# Patient Record
Sex: FEMALE | Employment: UNEMPLOYED | URBAN - METROPOLITAN AREA
[De-identification: names, ages, dates, MRNs, and addresses within clinical notes are randomized per-mention and may not be internally consistent; named-entity substitution may affect disease eponyms.]

---

## 2017-11-13 ENCOUNTER — OFFICE VISIT (OUTPATIENT)
Dept: NEUROLOGY | Age: 42
End: 2017-11-13

## 2017-11-13 VITALS — SYSTOLIC BLOOD PRESSURE: 122 MMHG | HEART RATE: 74 BPM | WEIGHT: 230 LBS | DIASTOLIC BLOOD PRESSURE: 80 MMHG

## 2017-11-13 DIAGNOSIS — F20.81 SCHIZOPHRENIFORM DISORDER (HCC): ICD-10-CM

## 2017-11-13 DIAGNOSIS — R25.1 TREMOR OF FACE AND HANDS: Primary | ICD-10-CM

## 2017-11-13 DIAGNOSIS — F79 MENTAL RETARDATION: ICD-10-CM

## 2017-11-13 RX ORDER — BENZTROPINE MESYLATE 1 MG/1
TABLET ORAL 2 TIMES DAILY
COMMUNITY

## 2017-11-13 RX ORDER — IRBESARTAN 150 MG/1
300 TABLET ORAL
COMMUNITY

## 2017-11-13 RX ORDER — ARIPIPRAZOLE 20 MG/1
20 TABLET ORAL
COMMUNITY

## 2017-11-13 RX ORDER — ONDANSETRON 4 MG/1
4 TABLET, FILM COATED ORAL
COMMUNITY

## 2017-11-13 RX ORDER — CARBAMAZEPINE 200 MG/1
200 TABLET ORAL 2 TIMES DAILY
COMMUNITY

## 2017-11-13 RX ORDER — DIPHENHYDRAMINE HCL 25 MG
25 CAPSULE ORAL
COMMUNITY

## 2017-11-13 RX ORDER — NYSTATIN 100000 [USP'U]/G
POWDER TOPICAL 4 TIMES DAILY
COMMUNITY

## 2017-11-13 RX ORDER — ERGOCALCIFEROL 1.25 MG/1
50000 CAPSULE ORAL
COMMUNITY

## 2017-11-13 NOTE — PROGRESS NOTES
Name: Martinez Velasquez    Chief Complaint: tremor    Returns for a follow up visit. She has had relatively few tremors but this is dependant on abilify. She was weaned off the drug however her behavior was negatively affected by this. She has been mostly unchanged with regards to her demeanor and health. Assesment and Plan  1. Tremor of face and hands  Minimal compared to initial presentation. Discussed the relation of her medications to tremor and reinforced that it is a risk vs benefit and that if the tremor can't be avoided it may be best to be tolerated in interest of her overall quality of life. 2. Mental retardation  stable    3. Schizophreniform disorder ([facility] [address]  Stable on abilify and benzotropine         Allergies  Review of patient's allergies indicates not on file. Medications  Current Outpatient Prescriptions   Medication Sig    ARIPiprazole (ABILIFY) 20 mg tablet Take 20 mg by mouth daily.  benztropine (COGENTIN) 1 mg tablet Take  by mouth two (2) times a day.  carBAMazepine (TEGRETOL) 200 mg tablet Take 200 mg by mouth three (3) times daily.  irbesartan (AVAPRO) 150 mg tablet Take 150 mg by mouth nightly.  lurasidone (LATUDA) 80 mg tab tablet Take  by mouth.  nystatin (MYCOSTATIN) powder Apply  to affected area four (4) times daily.  ergocalciferol (VITAMIN D2) 50,000 unit capsule Take 50,000 Units by mouth.  diphenhydrAMINE (BENADRYL) 25 mg capsule Take 25 mg by mouth every six (6) hours as needed.  ondansetron hcl (ZOFRAN, AS HYDROCHLORIDE,) 4 mg tablet Take 4 mg by mouth every eight (8) hours as needed for Nausea. No current facility-administered medications for this visit. Medical History  Past Medical History:   Diagnosis Date    Anxiety disorder     Depression     Fatigue     Frequent headaches     Headache     Joint pain      Review of Systems   Constitutional: Negative for chills and fever. HENT: Negative for ear pain.     Eyes: Negative for pain and discharge. Respiratory: Negative for cough and hemoptysis. Cardiovascular: Negative for chest pain and claudication. Gastrointestinal: Negative for constipation and diarrhea. Genitourinary: Negative for flank pain and hematuria. Musculoskeletal: Negative for back pain and myalgias. Skin: Negative for itching and rash. Neurological: Positive for tremors. Negative for headaches. Endo/Heme/Allergies: Negative for environmental allergies. Does not bruise/bleed easily. Psychiatric/Behavioral: Positive for memory loss. Negative for depression and hallucinations. The patient is nervous/anxious. Exam:    Visit Vitals    /80    Pulse 74    Wt 230 lb (104.3 kg)      General: Well developed, well nourished. Patient in no apparent distress   Head: Normocephalic, atraumatic, anicteric sclera  Slight oral tremor. Neck Normal ROM, No thyromegally   Lungs:  Clear to auscultation bilaterally, No wheezes or rubs   Cardiac: Regular rate and rhythm with no murmurs. Abd: Bowel sounds were audible. No tenderness on palpation   Ext: No pedal edema   Skin: Supple no rash     NeurologicExam:  Mental Status: Alert and oriented to person place and time   Speech: Fluent no aphasia or dysarthria. Cranial Nerves:  II - XII Inact   Motor:  Full and symmetric strength of upper and lower proximal and distal muscles. Normal bulk and tone. Reflexes:   Deep tendon reflexes 1+/4 and symmetric. Sensory:   Symmetric and intact with no perceived deficits modalities involving small or large fibers. Gait:  Gait is balanced and fluid with normal arm swing. Tremor:   No tremor noted. Cerebellar:  Coordination intact. Neurovascular: No carotid bruits.  No JVD

## 2017-11-13 NOTE — MR AVS SNAPSHOT
Visit Information Date & Time Provider Department Dept. Phone Encounter #  
 11/13/2017 11:00 AM Tab Angela MD [facility] [phone] [id number] Follow-up Instructions Return in about 1 year (around 11/13/2018) for tremor. Allergies as of 11/13/2017  Review Complete On: 11/13/2017 By: Tab Angela MD  
 Not on File Current Immunizations  Never Reviewed No immunizations on file. Not reviewed this visit You Were Diagnosed With   
  
 Codes Comments Tremor of face and hands    -  Primary ICD-10-CM: R25.1 ICD-9-CM: 781.0 Mental retardation     ICD-10-CM: F79 
ICD-9-CM: 795 [facility]     ICD-10-CM: Y59.74 ICD-9-CM: 295.40 Vitals BP Pulse Weight(growth percentile) Smoking Status 122/80 74 230 lb (104.3 kg) Never Smoker Your Updated Medication List  
  
   
This list is accurate as of: 11/13/17 11:28 AM.  Always use your most recent med list.  
  
  
  
  
 ARIPiprazole 20 mg tablet Commonly known as:  ABILIFY Take 20 mg by mouth daily. benztropine 1 mg tablet Commonly known as:  COGENTIN Take  by mouth two (2) times a day. carBAMazepine 200 mg tablet Commonly known as:  TEGretol Take 200 mg by mouth three (3) times daily. diphenhydrAMINE 25 mg capsule Commonly known as:  BENADRYL Take 25 mg by mouth every six (6) hours as needed. irbesartan 150 mg tablet Commonly known as:  AVAPRO Take 150 mg by mouth nightly. LATUDA 80 mg Tab tablet Generic drug:  lurasidone Take  by mouth. nystatin powder Commonly known as:  MYCOSTATIN Apply  to affected area four (4) times daily. VITAMIN D2 50,000 unit capsule Generic drug:  ergocalciferol Take 50,000 Units by mouth. ZOFRAN (AS HYDROCHLORIDE) 4 mg tablet Generic drug:  ondansetron hcl Take 4 mg by mouth every eight (8) hours as needed for Nausea. Follow-up Instructions Return in about 1 year (around 11/13/2018) for tremor. Patient Instructions PRESCRIPTION REFILL POLICY [facility] Statement to Patients April 1, 2014 In an effort to ensure the large volume of patient prescription refills is processed in the most efficient and expeditious manner, we are asking our patients to assist us by calling your Pharmacy for all prescription refills, this will include also your  Mail Order Pharmacy. The pharmacy will contact our office electronically to continue the refill process. Please do not wait until the last minute to call your pharmacy. We need at least 48 hours (2days) to fill prescriptions. We also encourage you to call your pharmacy before going to  your prescription to make sure it is ready. With regard to controlled substance prescription refill requests (narcotic refills) that need to be picked up at our office, we ask your cooperation by providing us with at least 72 hours (3days) notice that you will need a refill. We will not refill narcotic prescription refill requests after 4:00pm on any weekday, Monday through Thursday, or after 2:00pm on Fridays, or on the weekends. We encourage everyone to explore another way of getting your prescription refill request processed using [facility], our patient web portal through our electronic medical record system. [facility] is an efficient and effective way to communicate your medication request directly to the office and  downloadable as an samira on your smart phone . [facility] also features a review functionality that allows you to view your medication list as well as leave messages for your physician. Are you ready to get connected? If so please review the attatched instructions or speak to any of our staff to get you set up right away! Thank you so much for your cooperation.  Should you have any questions please contact our Practice Administrator. The Physicians and Staff,  [facility] Please be advised there is a $25 fee for all paperwork to be completed from our  providers. This is to be paid by the patient prior to picking up the completed forms. A Healthy Lifestyle: Care Instructions Your Care Instructions A healthy lifestyle can help you feel good, stay at a healthy weight, and have plenty of energy for both work and play. A healthy lifestyle is something you can share with your whole family. A healthy lifestyle also can lower your risk for serious health problems, such as high blood pressure, heart disease, and diabetes. You can follow a few steps listed below to improve your health and the health of your family. Follow-up care is a key part of your treatment and safety. Be sure to make and go to all appointments, and call your doctor if you are having problems. It's also a good idea to know your test results and keep a list of the medicines you take. How can you care for yourself at home? · Do not eat too much sugar, fat, or fast foods. You can still have dessert and treats now and then. The goal is moderation. · Start small to improve your eating habits. Pay attention to portion sizes, drink less juice and soda pop, and eat more fruits and vegetables. ¨ Eat a healthy amount of food. A 3-ounce serving of meat, for example, is about the size of a deck of cards. Fill the rest of your plate with vegetables and whole grains. ¨ Limit the amount of soda and sports drinks you have every day. Drink more water when you are thirsty. ¨ Eat at least 5 servings of fruits and vegetables every day. It may seem like a lot, but it is not hard to reach this goal. A serving or helping is 1 piece of fruit, 1 cup of vegetables, or 2 cups of leafy, raw vegetables.  Have an apple or some carrot sticks as an afternoon snack instead of a candy bar. Try to have fruits and/or vegetables at every meal. 
· Make exercise part of your daily routine. You may want to start with simple activities, such as walking, bicycling, or slow swimming. Try to be active 30 to 60 minutes every day. You do not need to do all 30 to 60 minutes all at once. For example, you can exercise 3 times a day for 10 or 20 minutes. Moderate exercise is safe for most people, but it is always a good idea to talk to your doctor before starting an exercise program. 
· Keep moving. Meme Preethi the lawn, work in the garden, or 20lines. Take the stairs instead of the elevator at work. · If you smoke, quit. People who smoke have an increased risk for heart attack, stroke, cancer, and other lung illnesses. Quitting is hard, but there are ways to boost your chance of quitting tobacco for good. ¨ Use nicotine gum, patches, or lozenges. ¨ Ask your doctor about stop-smoking programs and medicines. ¨ Keep trying. In addition to reducing your risk of diseases in the future, you will notice some benefits soon after you stop using tobacco. If you have shortness of breath or asthma symptoms, they will likely get better within a few weeks after you quit. · Limit how much alcohol you drink. Moderate amounts of alcohol (up to 2 drinks a day for men, 1 drink a day for women) are okay. But drinking too much can lead to liver problems, high blood pressure, and other health problems. Family health If you have a family, there are many things you can do together to improve your health. · Eat meals together as a family as often as possible. · Eat healthy foods. This includes fruits, vegetables, lean meats and dairy, and whole grains. · Include your family in your fitness plan. Most people think of activities such as jogging or tennis as the way to fitness, but there are many ways you and your family can be more active.  Anything that makes you breathe hard and gets your heart pumping is exercise. Here are some tips: 
¨ Walk to do errands or to take your child to school or the bus. ¨ Go for a family bike ride after dinner instead of watching TV. Where can you learn more? Go to http://yisel-jossy.info/. Enter B350 in the search box to learn more about \"A Healthy Lifestyle: Care Instructions. \" Current as of: May 12, 2017 Content Version: 11.4 © 3231-7525 [employer]. Care instructions adapted under license by [employer] (which disclaims liability or warranty for this information). If you have questions about a medical condition or this instruction, always ask your healthcare professional. [address] any warranty or liability for your use of this information. Introducing [facility] [place] [facility]! [facility] introduces [employer] ([employer]) patient portal. Now you can access parts of your medical record, email your doctor's office, and request medication refills online. 1. In your internet browser, go to [email or web address] 2. Click on the First Time User? Click Here link in the Sign In box. You will see the New Member Sign Up page. 3. Enter your [employer]) Access Code exactly as it appears below. You will not need to use this code after youve completed the sign-up process. If you do not sign up before the expiration date, you must request a new code. · [employer] ([employer]) Access Code: [email or web address] Expires: 2/11/2018 10:32 AM 
 
4. Enter the last four digits of your Social Security Number (xxxx) and Date of Birth (mm/dd/yyyy) as indicated and click Submit. You will be taken to the next sign-up page. 5. Create a [employer]) ID. This will be your [employer]) login ID and cannot be changed, so think of one that is secure and easy to remember. 6. Create a [employer]) password. You can change your password at any time. 7. Enter your Password Reset Question and Answer.  This can be used at a later time if you forget your password. 8. Enter your e-mail address. You will receive e-mail notification when new information is available in [address] 9. Click Sign Up. You can now view and download portions of your medical record. 10. Click the Download Summary menu link to download a portable copy of your medical information. If you have questions, please visit the Frequently Asked Questions section of the [employer] website. Remember, [employer] is NOT to be used for urgent needs. For medical emergencies, dial 911. Now available from your iPhone and Android! Please provide this summary of care documentation to your next provider. If you have any questions after today's visit, please call [phone].

## 2017-11-13 NOTE — PATIENT INSTRUCTIONS
[facility]   Statement to Patients  April 1, 2014      In an effort to ensure the large volume of patient prescription refills is processed in the most efficient and expeditious manner, we are asking our patients to assist us by calling your Pharmacy for all prescription refills, this will include also your  Mail Order Pharmacy. The pharmacy will contact our office electronically to continue the refill process. Please do not wait until the last minute to call your pharmacy. We need at least 48 hours (2days) to fill prescriptions. We also encourage you to call your pharmacy before going to  your prescription to make sure it is ready. With regard to controlled substance prescription refill requests (narcotic refills) that need to be picked up at our office, we ask your cooperation by providing us with at least 72 hours (3days) notice that you will need a refill. We will not refill narcotic prescription refill requests after 4:00pm on any weekday, Monday through Thursday, or after 2:00pm on Fridays, or on the weekends. We encourage everyone to explore another way of getting your prescription refill request processed using [employer], our patient web portal through our electronic medical record system. [employer] is an efficient and effective way to communicate your medication request directly to the office and  downloadable as an samira on your smart phone . [employer] also features a review functionality that allows you to view your medication list as well as leave messages for your physician. Are you ready to get connected? If so please review the attatched instructions or speak to any of our staff to get you set up right away! Thank you so much for your cooperation. Should you have any questions please contact our Practice Administrator.     The Physicians and Staff,  [facility]     Please be advised there is a $25 fee for all paperwork to be completed from our  providers. This is to be paid by the patient prior to picking up the completed forms. A Healthy Lifestyle: Care Instructions  Your Care Instructions    A healthy lifestyle can help you feel good, stay at a healthy weight, and have plenty of energy for both work and play. A healthy lifestyle is something you can share with your whole family. A healthy lifestyle also can lower your risk for serious health problems, such as high blood pressure, heart disease, and diabetes. You can follow a few steps listed below to improve your health and the health of your family. Follow-up care is a key part of your treatment and safety. Be sure to make and go to all appointments, and call your doctor if you are having problems. It's also a good idea to know your test results and keep a list of the medicines you take. How can you care for yourself at home? · Do not eat too much sugar, fat, or fast foods. You can still have dessert and treats now and then. The goal is moderation. · Start small to improve your eating habits. Pay attention to portion sizes, drink less juice and soda pop, and eat more fruits and vegetables. ¨ Eat a healthy amount of food. A 3-ounce serving of meat, for example, is about the size of a deck of cards. Fill the rest of your plate with vegetables and whole grains. ¨ Limit the amount of soda and sports drinks you have every day. Drink more water when you are thirsty. ¨ Eat at least 5 servings of fruits and vegetables every day. It may seem like a lot, but it is not hard to reach this goal. A serving or helping is 1 piece of fruit, 1 cup of vegetables, or 2 cups of leafy, raw vegetables. Have an apple or some carrot sticks as an afternoon snack instead of a candy bar. Try to have fruits and/or vegetables at every meal.  · Make exercise part of your daily routine. You may want to start with simple activities, such as walking, bicycling, or slow swimming.  Try to be active 30 to 60 minutes every day. You do not need to do all 30 to 60 minutes all at once. For example, you can exercise 3 times a day for 10 or 20 minutes. Moderate exercise is safe for most people, but it is always a good idea to talk to your doctor before starting an exercise program.  · Keep moving. Michael Moyer the lawn, work in the garden, or [employer]. Take the stairs instead of the elevator at work. · If you smoke, quit. People who smoke have an increased risk for heart attack, stroke, cancer, and other lung illnesses. Quitting is hard, but there are ways to boost your chance of quitting tobacco for good. ¨ Use nicotine gum, patches, or lozenges. ¨ Ask your doctor about stop-smoking programs and medicines. ¨ Keep trying. In addition to reducing your risk of diseases in the future, you will notice some benefits soon after you stop using tobacco. If you have shortness of breath or asthma symptoms, they will likely get better within a few weeks after you quit. · Limit how much alcohol you drink. Moderate amounts of alcohol (up to 2 drinks a day for men, 1 drink a day for women) are okay. But drinking too much can lead to liver problems, high blood pressure, and other health problems. Family health  If you have a family, there are many things you can do together to improve your health. · Eat meals together as a family as often as possible. · Eat healthy foods. This includes fruits, vegetables, lean meats and dairy, and whole grains. · Include your family in your fitness plan. Most people think of activities such as jogging or tennis as the way to fitness, but there are many ways you and your family can be more active. Anything that makes you breathe hard and gets your heart pumping is exercise. Here are some tips:  ¨ Walk to do errands or to take your child to school or the bus. ¨ Go for a family bike ride after dinner instead of watching TV. Where can you learn more?   Go to http://yisel-jossy.info/. Enter K569 in the search box to learn more about \"A Healthy Lifestyle: Care Instructions. \"  Current as of: May 12, 2017  Content Version: 11.4  © 4429-0455 Healthwise, [employer]. Care instructions adapted under license by [employer] (which disclaims liability or warranty for this information). If you have questions about a medical condition or this instruction, always ask your healthcare professional. [place] [address] any warranty or liability for your use of this information.

## 2018-11-13 ENCOUNTER — OFFICE VISIT (OUTPATIENT)
Dept: NEUROLOGY | Age: 43
End: 2018-11-13

## 2018-11-13 ENCOUNTER — TELEPHONE (OUTPATIENT)
Dept: NEUROLOGY | Age: 43
End: 2018-11-13

## 2018-11-13 VITALS
SYSTOLIC BLOOD PRESSURE: 122 MMHG | OXYGEN SATURATION: 92 % | HEART RATE: 99 BPM | BODY MASS INDEX: 39.7 KG/M2 | WEIGHT: 247 LBS | DIASTOLIC BLOOD PRESSURE: 72 MMHG | HEIGHT: 66 IN

## 2018-11-13 DIAGNOSIS — E01.0 THYROMEGALY: ICD-10-CM

## 2018-11-13 DIAGNOSIS — G96.9 TREMOR DUE TO DISORDER OF CNS: Primary | ICD-10-CM

## 2018-11-13 DIAGNOSIS — F20.81 SCHIZOPHRENIFORM DISORDER (HCC): ICD-10-CM

## 2018-11-13 DIAGNOSIS — F79 MENTAL RETARDATION: ICD-10-CM

## 2018-11-13 DIAGNOSIS — R25.1 TREMOR DUE TO DISORDER OF CNS: Primary | ICD-10-CM

## 2018-11-13 PROBLEM — E66.01 SEVERE OBESITY (HCC): Status: ACTIVE | Noted: 2018-11-13

## 2018-11-13 RX ORDER — DESONIDE 0.5 MG/G
OINTMENT TOPICAL 2 TIMES DAILY
COMMUNITY

## 2018-11-13 NOTE — PROGRESS NOTES
Name: Mihai Downing Chief Complaint: tremor Returns for a follow up visit. She has had relatively few tremors with a few facial tics. These seem to bother her mother than the patient. She is concerned that she has tarditive dyskinesia. She has been mostly unchanged with regards to her demeanor and health. MAR was reviewed. Excited she is heading to [place] to see her father. Her birthday is close. Assesment and Plan 1. Tremor of face and hands 
stable 2. Mental retardation 
stable 3. Schizophreniform disorder ([address] Stable on abilify and benzotropine 4. Thyromegally. Will refer to PCP for management. Discussed with mother 
(will be seing Dr. Radames Cerda this week) 5. Overweight Advised to lose weight. Allergies Patient has no allergy information on record. Medications Current Outpatient Medications Medication Sig  
 desonide (DESOWEN) 0.05 % topical ointment Apply  to affected area two (2) times a day.  ARIPiprazole (ABILIFY) 20 mg tablet Take 20 mg by mouth daily.  benztropine (COGENTIN) 1 mg tablet Take  by mouth two (2) times a day.  carBAMazepine (TEGRETOL) 200 mg tablet Take 200 mg by mouth three (3) times daily.  irbesartan (AVAPRO) 150 mg tablet Take 150 mg by mouth nightly.  lurasidone (LATUDA) 80 mg tab tablet Take  by mouth.  ergocalciferol (VITAMIN D2) 50,000 unit capsule Take 50,000 Units by mouth.  diphenhydrAMINE (BENADRYL) 25 mg capsule Take 25 mg by mouth every six (6) hours as needed.  nystatin (MYCOSTATIN) powder Apply  to affected area four (4) times daily.  ondansetron hcl (ZOFRAN, AS HYDROCHLORIDE,) 4 mg tablet Take 4 mg by mouth every eight (8) hours as needed for Nausea. No current facility-administered medications for this visit. Medical History Past Medical History:  
Diagnosis Date  Anxiety disorder  Depression  Fatigue  Frequent headaches  Headache  Joint pain Review of Systems Constitutional: Negative for chills and fever. HENT: Negative for ear pain. Eyes: Negative for pain and discharge. Respiratory: Negative for cough and hemoptysis. Cardiovascular: Negative for chest pain and claudication. Gastrointestinal: Negative for constipation and diarrhea. Genitourinary: Negative for flank pain and hematuria. Musculoskeletal: Negative for back pain and myalgias. Skin: Negative for itching and rash. Neurological: Positive for tremors. Negative for headaches. Endo/Heme/Allergies: Negative for environmental allergies. Does not bruise/bleed easily. Psychiatric/Behavioral: Positive for memory loss. Negative for depression and hallucinations. The patient is nervous/anxious. Exam: 
 
Visit Vitals /72 Pulse 99 Ht 5' 6\" (1.676 m) Wt 247 lb (112 kg) SpO2 92% BMI 39.87 kg/m² General: Well developed, well nourished. Patient in no apparent distress Head: Normocephalic, atraumatic, anicteric sclera Slight oral tremor. Neck Normal ROM, Has thyromegally Lungs:  Clear to auscultation bilaterally, No wheezes or rubs Cardiac: Regular rate and rhythm with no murmurs. Abd: Bowel sounds were audible. No tenderness on palpation Ext: No pedal edema Skin: Supple no rash NeurologicExam: 
Mental Status: Alert and oriented to person place and time Speech: Fluent no aphasia or dysarthria. Cranial Nerves:  II - XII Inact Motor:  Full and symmetric strength of upper and lower proximal and distal muscles. Normal bulk and tone. Reflexes:   Deep tendon reflexes 1+/4 and symmetric. Sensory:   Symmetric and intact with no perceived deficits modalities involving small or large fibers. Gait:  Gait is wide based. .   
Tremor:   No tremor noted. Cerebellar:  Coordination intact. Neurovascular: No carotid bruits. No JVD

## 2018-11-13 NOTE — PATIENT INSTRUCTIONS
Office Policies Phone calls/patient messages:· Please allow up to 24 hours for someone in the office to contact you about your call or message. Be mindful your provider may be out of the office or your message may require further review. We encourage you to use [employer] for your messages as this is a faster, more efficient way to communicate with our office Medication Refills: · Prescription medications require up to 48 business hours to process. We encourage you to use [employer] for your refills. · For controlled medications: Please allow up to 72 business hours to process. Certain medications may require you to  a written prescription at our office. · NO narcotic/controlled medications will be prescribed after 4pm Monday through Friday or on weekends Form/Paperwork Completion:· Please note there is a $25 fee for all paperwork completed by our providers. We ask that you allow 7-14 business days. Pre-payment is due prior to picking up/faxing the completed form. You may also download your forms to [employer] to have your doctor print off. A Healthy Lifestyle: Care Instructions Your Care Instructions A healthy lifestyle can help you feel good, stay at a healthy weight, and have plenty of energy for both work and play. A healthy lifestyle is something you can share with your whole family. A healthy lifestyle also can lower your risk for serious health problems, such as high blood pressure, heart disease, and diabetes. You can follow a few steps listed below to improve your health and the health of your family. Follow-up care is a key part of your treatment and safety. Be sure to make and go to all appointments, and call your doctor if you are having problems. It's also a good idea to know your test results and keep a list of the medicines you take. How can you care for yourself at home? · Do not eat too much sugar, fat, or fast foods.  You can still have dessert and treats now and then. The goal is moderation. · Start small to improve your eating habits. Pay attention to portion sizes, drink less juice and soda pop, and eat more fruits and vegetables. ? Eat a healthy amount of food. A 3-ounce serving of meat, for example, is about the size of a deck of cards. Fill the rest of your plate with vegetables and whole grains. ? Limit the amount of soda and sports drinks you have every day. Drink more water when you are thirsty. ? Eat at least 5 servings of fruits and vegetables every day. It may seem like a lot, but it is not hard to reach this goal. A serving or helping is 1 piece of fruit, 1 cup of vegetables, or 2 cups of leafy, raw vegetables. Have an apple or some carrot sticks as an afternoon snack instead of a candy bar. Try to have fruits and/or vegetables at every meal. 
· Make exercise part of your daily routine. You may want to start with simple activities, such as walking, bicycling, or slow swimming. Try to be active 30 to 60 minutes every day. You do not need to do all 30 to 60 minutes all at once. For example, you can exercise 3 times a day for 10 or 20 minutes. Moderate exercise is safe for most people, but it is always a good idea to talk to your doctor before starting an exercise program. 
· Keep moving. En[place] Chance the lawn, work in the garden, or Tendr. Take the stairs instead of the elevator at work. · If you smoke, quit. People who smoke have an increased risk for heart attack, stroke, cancer, and other lung illnesses. Quitting is hard, but there are ways to boost your chance of quitting tobacco for good. ? Use nicotine gum, patches, or lozenges. ? Ask your doctor about stop-smoking programs and medicines. ? Keep trying.  
In addition to reducing your risk of diseases in the future, you will notice some benefits soon after you stop using tobacco. If you have shortness of breath or asthma symptoms, they will likely get better within a few weeks after you quit. · Limit how much alcohol you drink. Moderate amounts of alcohol (up to 2 drinks a day for men, 1 drink a day for women) are okay. But drinking too much can lead to liver problems, high blood pressure, and other health problems. Family health If you have a family, there are many things you can do together to improve your health. · Eat meals together as a family as often as possible. · Eat healthy foods. This includes fruits, vegetables, lean meats and dairy, and whole grains. · Include your family in your fitness plan. Most people think of activities such as jogging or tennis as the way to fitness, but there are many ways you and your family can be more active. Anything that makes you breathe hard and gets your heart pumping is exercise. Here are some tips: 
? Walk to do errands or to take your child to school or the bus. 
? Go for a family bike ride after dinner instead of watching TV. Where can you learn more? Go to http://yisel-jossy.info/. Enter Q941 in the search box to learn more about \"A Healthy Lifestyle: Care Instructions. \" Current as of: December 7, 2017 Content Version: 11.8 © 9842-2909 [employer], M[employer]. Care instructions adapted under license by "[employer] (which disclaims liability or warranty for this information). If you have questions about a medical condition or this instruction, always ask your healthcare professional. [place] [address] any warranty or liability for your use of this information.

## 2018-11-13 NOTE — TELEPHONE ENCOUNTER
----- Message from Collette Osier sent at 11/13/2018 12:08 PM EST -----  Regarding: Dr. Nella Alston with The [address] [place] [address] Drive is making sure the office has the correct fax number to send the patient's medical visit form. [phone]

## 2020-01-28 ENCOUNTER — OFFICE VISIT (OUTPATIENT)
Dept: NEUROLOGY | Age: 45
End: 2020-01-28

## 2020-01-28 VITALS
DIASTOLIC BLOOD PRESSURE: 76 MMHG | BODY MASS INDEX: 39.7 KG/M2 | SYSTOLIC BLOOD PRESSURE: 114 MMHG | HEIGHT: 66 IN | WEIGHT: 247 LBS

## 2020-01-28 DIAGNOSIS — I10 ESSENTIAL HYPERTENSION: Primary | ICD-10-CM

## 2020-01-28 DIAGNOSIS — E78.2 MIXED HYPERLIPIDEMIA: ICD-10-CM

## 2020-01-28 DIAGNOSIS — R56.9 SEIZURE (HCC): ICD-10-CM

## 2020-01-28 DIAGNOSIS — F20.81 SCHIZOPHRENIFORM DISORDER (HCC): ICD-10-CM

## 2020-01-28 NOTE — PROGRESS NOTES
Name: Prabhu Del Cid    Chief Complaint: tremor    Returns for a follow up visit. Tremors and tics have calmed down . She has been mostly unchanged with regards to her demeanor and health. MAR was reviewed. She is is having her birdthday tomorrow and is very animated. Has not had the tegretol checked yet nor the cmp. Assesment and Plan  1. Tremor of face and hands  resolved    2. Mental retardation  stable    3. Schizophreniform disorder (HCC)  Stable on abilify and benzotropine     4. Overweight  Advised to lose weight. 6. Seizure disorder  Continue tegretol  Needs levels checked    Allergies  Patient has no known allergies. Medications  Current Outpatient Medications   Medication Sig    desonide (DESOWEN) 0.05 % topical ointment Apply  to affected area two (2) times a day.  ARIPiprazole (ABILIFY) 20 mg tablet Take 20 mg by mouth daily.  benztropine (COGENTIN) 1 mg tablet Take  by mouth two (2) times a day.  carBAMazepine (TEGRETOL) 200 mg tablet Take 200 mg by mouth three (3) times daily.  irbesartan (AVAPRO) 150 mg tablet Take 150 mg by mouth nightly.  lurasidone (LATUDA) 80 mg tab tablet Take  by mouth.  nystatin (MYCOSTATIN) powder Apply  to affected area four (4) times daily.  ergocalciferol (VITAMIN D2) 50,000 unit capsule Take 50,000 Units by mouth.  diphenhydrAMINE (BENADRYL) 25 mg capsule Take 25 mg by mouth every six (6) hours as needed.  ondansetron hcl (ZOFRAN, AS HYDROCHLORIDE,) 4 mg tablet Take 4 mg by mouth every eight (8) hours as needed for Nausea. No current facility-administered medications for this visit. Medical History  Past Medical History:   Diagnosis Date    Anxiety disorder     Depression     Fatigue     Frequent headaches     Headache     Joint pain      Review of Systems   Constitutional: Negative for chills and fever. HENT: Negative for ear pain. Eyes: Negative for pain and discharge.    Respiratory: Negative for cough and hemoptysis. Cardiovascular: Negative for chest pain and claudication. Gastrointestinal: Negative for constipation and diarrhea. Genitourinary: Negative for flank pain and hematuria. Musculoskeletal: Negative for back pain and myalgias. Skin: Negative for itching and rash. Neurological: Positive for tremors. Negative for headaches. Endo/Heme/Allergies: Negative for environmental allergies. Does not bruise/bleed easily. Psychiatric/Behavioral: Positive for memory loss. Negative for depression and hallucinations. The patient is nervous/anxious. Exam:    Visit Vitals  /76   Ht 5' 6\" (1.676 m)   Wt 247 lb (112 kg)   BMI 39.87 kg/m²      General: Well developed, well nourished. Patient in no apparent distress   Head: Normocephalic, atraumatic, anicteric sclera  Slight oral tremor. Neck Normal ROM, Has thyromegally   Lungs:  Clear to auscultation bilaterally, No wheezes or rubs   Cardiac: Regular rate and rhythm with no murmurs. Abd: Bowel sounds were audible. No tenderness on palpation   Ext: No pedal edema   Skin: Supple no rash     NeurologicExam:  Mental Status: Alert and oriented to person place and time   Speech: Fluent no aphasia or dysarthria. Cranial Nerves:  II - XII Inact   Motor:  Full and symmetric strength of upper and lower proximal and distal muscles. Normal bulk and tone. Reflexes:   Deep tendon reflexes 1+/4 and symmetric. Sensory:   Symmetric and intact with no perceived deficits modalities involving small or large fibers. Gait:  Gait is wide based. .    Tremor:   No tremor noted. Cerebellar:  Coordination intact. Neurovascular: No carotid bruits.  No JVD

## 2020-01-29 LAB
ALBUMIN SERPL-MCNC: 3.9 G/DL (ref 3.8–4.8)
ALBUMIN/GLOB SERPL: 1.2 {RATIO} (ref 1.2–2.2)
ALP SERPL-CCNC: 91 IU/L (ref 39–117)
ALT SERPL-CCNC: 8 IU/L (ref 0–32)
AST SERPL-CCNC: 9 IU/L (ref 0–40)
BILIRUB SERPL-MCNC: 0.3 MG/DL (ref 0–1.2)
BUN SERPL-MCNC: 11 MG/DL (ref 6–24)
BUN/CREAT SERPL: 14 (ref 9–23)
CALCIUM SERPL-MCNC: 9.2 MG/DL (ref 8.7–10.2)
CARBAMAZEPINE SERPL-MCNC: 6.4 UG/ML (ref 4–12)
CHLORIDE SERPL-SCNC: 104 MMOL/L (ref 96–106)
CO2 SERPL-SCNC: 23 MMOL/L (ref 20–29)
CREAT SERPL-MCNC: 0.76 MG/DL (ref 0.57–1)
GLOBULIN SER CALC-MCNC: 3.2 G/DL (ref 1.5–4.5)
GLUCOSE SERPL-MCNC: 97 MG/DL (ref 65–99)
POTASSIUM SERPL-SCNC: 4.2 MMOL/L (ref 3.5–5.2)
PROT SERPL-MCNC: 7.1 G/DL (ref 6–8.5)
SODIUM SERPL-SCNC: 143 MMOL/L (ref 134–144)

## 2021-03-02 ENCOUNTER — VIRTUAL VISIT (OUTPATIENT)
Dept: NEUROLOGY | Age: 46
End: 2021-03-02
Payer: MEDICARE

## 2021-03-02 DIAGNOSIS — F20.81 SCHIZOPHRENIFORM DISORDER (HCC): ICD-10-CM

## 2021-03-02 DIAGNOSIS — G40.301 GENERALIZED IDIOPATHIC EPILEPSY AND EPILEPTIC SYNDROMES WITH STATUS EPILEPTICUS, NOT INTRACTABLE (HCC): Primary | ICD-10-CM

## 2021-03-02 DIAGNOSIS — E78.2 MIXED HYPERLIPIDEMIA: ICD-10-CM

## 2021-03-02 DIAGNOSIS — I10 ESSENTIAL HYPERTENSION: ICD-10-CM

## 2021-03-02 PROCEDURE — G8432 DEP SCR NOT DOC, RNG: HCPCS | Performed by: PSYCHIATRY & NEUROLOGY

## 2021-03-02 PROCEDURE — G8421 BMI NOT CALCULATED: HCPCS | Performed by: PSYCHIATRY & NEUROLOGY

## 2021-03-02 PROCEDURE — 99214 OFFICE O/P EST MOD 30 MIN: CPT | Performed by: PSYCHIATRY & NEUROLOGY

## 2021-03-02 PROCEDURE — G8756 NO BP MEASURE DOC: HCPCS | Performed by: PSYCHIATRY & NEUROLOGY

## 2021-03-02 PROCEDURE — G8427 DOCREV CUR MEDS BY ELIG CLIN: HCPCS | Performed by: PSYCHIATRY & NEUROLOGY

## 2021-03-02 RX ORDER — CHLORHEXIDINE GLUCONATE 1.2 MG/ML
RINSE ORAL
COMMUNITY
Start: 2021-02-11

## 2021-03-02 NOTE — LETTER
3/2/2021 9:11 AM    Ms. Esteban Pedraza [id number]      Seizure protocol    Protect head. Yunier Narayan on her side with something soft under her head to protect her head. Dont place anything in her mouth. Note time of start and time of end of seizure  Note any abnormal movement during the seizure    For seizures less than 5 minutes with return to baseline within 15 minutes. Report to neurologist. No need for ER visit if not injured. For seizures lasting more than 5 minutes or if she does not regain baseline functioning within 20 minutes or if she has multiple seizures in one day call 911.        Sincerely,      Mee Miller MD

## 2021-03-02 NOTE — PROGRESS NOTES
Marin Ruff is a 51 y.o. female  HIPAA verified by two patient identifiers. Speaking with Camilo Phillips (). Chief Complaint   Patient presents with    Follow-up     Tremors     Health Maintenance Due   Topic    Hepatitis C Screening     DTaP/Tdap/Td series (1 - Tdap)    PAP AKA CERVICAL CYTOLOGY     Lipid Screen     Medicare Yearly Exam      Patient-Reported Vitals 3/2/2021   Patient-Reported Weight 247lb   Patient-Reported Pulse 89   Patient-Reported SpO2 97   Patient-Reported Systolic  970   Patient-Reported Diastolic 78       Pain Scale:0 /10  Pain Location:      1. Have you been to the ER, urgent care clinic since your last visit? Hospitalized since your last visit? Yes When: [facility] tested in Feb. test came back neg. 2. Have you seen or consulted any other health care providers outside of the [address] [place] [address] since your last visit? Include any pap smears or colon screening.  No

## 2021-03-02 NOTE — PROGRESS NOTES
Name: Laquita Harp    Chief Complaint: tremor    Patient has been doing well. No hospitalizations. No seizures. No tremors. Assesment and Plan  1. Tremor of face and hands  resolved    2. Mental retardation  stable    3. Schizophreniform disorder (HCC)  Stable on abilify and benzotropine     4. Overweight  Advised to lose weight. 6. Generalized idiopathic epilepsy  Continue tegretol  Last seizure was about six years  Needs levels checked     Seizure protocol discussed      Allergies  Patient has no known allergies. Medications  Current Outpatient Medications   Medication Sig    chlorhexidine (PERIDEX) 0.12 % solution     desonide (DESOWEN) 0.05 % topical ointment Apply  to affected area two (2) times a day.  ARIPiprazole (ABILIFY) 20 mg tablet Take 20 mg by mouth daily.  benztropine (COGENTIN) 1 mg tablet Take  by mouth two (2) times a day.  carBAMazepine (TEGRETOL) 200 mg tablet Take 200 mg by mouth three (3) times daily.  irbesartan (AVAPRO) 150 mg tablet Take 150 mg by mouth nightly.  lurasidone (LATUDA) 80 mg tab tablet Take  by mouth.  nystatin (MYCOSTATIN) powder Apply  to affected area four (4) times daily.  ergocalciferol (VITAMIN D2) 50,000 unit capsule Take 50,000 Units by mouth.  diphenhydrAMINE (BENADRYL) 25 mg capsule Take 25 mg by mouth every six (6) hours as needed.  ondansetron hcl (ZOFRAN, AS HYDROCHLORIDE,) 4 mg tablet Take 4 mg by mouth every eight (8) hours as needed for Nausea. No current facility-administered medications for this visit. Medical History  Past Medical History:   Diagnosis Date    Anxiety disorder     Depression     Fatigue     Frequent headaches     Headache     Joint pain      Review of Systems   Constitutional: Negative for chills and fever. HENT: Negative for ear pain. Eyes: Negative for pain and discharge. Respiratory: Negative for cough and hemoptysis.     Cardiovascular: Negative for chest pain and claudication. Gastrointestinal: Negative for constipation and diarrhea. Genitourinary: Negative for flank pain and hematuria. Musculoskeletal: Negative for back pain and myalgias. Skin: Negative for itching and rash. Neurological: Positive for tremors. Negative for headaches. Endo/Heme/Allergies: Negative for environmental allergies. Does not bruise/bleed easily. Psychiatric/Behavioral: Positive for memory loss. Negative for depression and hallucinations. The patient is nervous/anxious. Exam:    General: Well developed, well nourished. Patient in no apparent distress   Head: Normocephalic, atraumatic, anicteric sclera   Neck Normal ROM   Lungs:  Normal effort   Ext: No pedal edema   Skin: Supple no rash     NeurologicExam:  Mental Status: Alert and oriented to person place   Speech: Fluent no aphasia or dysarthria. Childish    Cranial Nerves:   II-XII Intact    Motor:  Full and symmetric strength of upper and lower ext . Normal bulk. Sensory:   Symmetric and intact    Gait:  Gait is balanced    Tremor:   No tremor noted. Cerebellar:  Coordination intact. Pradeep Dale was seen by synchronous (real-time) audio-video technology on 03/02/21. Consent:  She and/or her healthcare decision maker is aware that this patient-initiated Telehealth encounter is a billable service, with coverage as determined by her insurance carrier. She is aware that she may receive a bill and has provided verbal consent to proceed: Yes    I was in the office while conducting this encounter. Pursuant to the emergency declaration under the [facility], [facility] waiver authority and the [employer] Act, this Virtual  Visit was conducted, with patient's consent, to reduce the patient's risk of exposure to COVID-19 and provide continuity of care for an established patient.      Services were provided through a video synchronous discussion virtually to substitute for in-person clinic visit.

## 2021-03-11 LAB
ALBUMIN SERPL-MCNC: 3.8 G/DL (ref 3.8–4.8)
ALBUMIN/GLOB SERPL: 1.6 {RATIO} (ref 1.2–2.2)
ALP SERPL-CCNC: 77 IU/L (ref 39–117)
ALT SERPL-CCNC: 7 IU/L (ref 0–32)
AST SERPL-CCNC: 11 IU/L (ref 0–40)
BILIRUB SERPL-MCNC: 0.3 MG/DL (ref 0–1.2)
BUN SERPL-MCNC: 15 MG/DL (ref 6–24)
BUN/CREAT SERPL: 19 (ref 9–23)
CALCIUM SERPL-MCNC: 8 MG/DL (ref 8.7–10.2)
CARBAMAZEPINE SERPL-MCNC: 5 UG/ML (ref 4–12)
CHLORIDE SERPL-SCNC: 110 MMOL/L (ref 96–106)
CO2 SERPL-SCNC: 20 MMOL/L (ref 20–29)
CREAT SERPL-MCNC: 0.77 MG/DL (ref 0.57–1)
GLOBULIN SER CALC-MCNC: 2.4 G/DL (ref 1.5–4.5)
GLUCOSE SERPL-MCNC: 113 MG/DL (ref 65–99)
POTASSIUM SERPL-SCNC: 3.9 MMOL/L (ref 3.5–5.2)
PROT SERPL-MCNC: 6.2 G/DL (ref 6–8.5)
SODIUM SERPL-SCNC: 144 MMOL/L (ref 134–144)

## 2021-09-08 ENCOUNTER — TELEPHONE (OUTPATIENT)
Dept: NEUROLOGY | Age: 46
End: 2021-09-08

## 2021-09-08 ENCOUNTER — VIRTUAL VISIT (OUTPATIENT)
Dept: NEUROLOGY | Age: 46
End: 2021-09-08

## 2021-09-08 NOTE — TELEPHONE ENCOUNTER
Called and spoke to  at patient facility.  states that patient insisted on going to day program so is not available for virtual visit. States that patient is doing very well and has regained interest in going to programs and day programs. But patient is not available for the virtual visit. Wants to cancel.

## 2021-09-08 NOTE — TELEPHONE ENCOUNTER
----- Message from Gita Martinez sent at 9/8/2021  8:18 AM EDT -----  Regarding: / telephone  General Message/Vendor Calls    Caller's first and last name: Qiana Grace ( good neighbor)      Reason for call: miss call      Callback required yes/no and why: yes      Best contact number(s): [phone]      Details to clarify the request: pt missed call on 09/08/2021. Pt believes it has something to do with their 6 month virtual follow up.        Gita Martinez

## 2022-02-23 ENCOUNTER — VIRTUAL VISIT (OUTPATIENT)
Dept: NEUROLOGY | Age: 47
End: 2022-02-23
Payer: MEDICARE

## 2022-02-23 DIAGNOSIS — G40.301 GENERALIZED IDIOPATHIC EPILEPSY AND EPILEPTIC SYNDROMES WITH STATUS EPILEPTICUS, NOT INTRACTABLE (HCC): Primary | ICD-10-CM

## 2022-02-23 DIAGNOSIS — F20.81 SCHIZOPHRENIFORM DISORDER (HCC): ICD-10-CM

## 2022-02-23 DIAGNOSIS — F79 INTELLECTUAL DISABILITY: ICD-10-CM

## 2022-02-23 PROCEDURE — G8421 BMI NOT CALCULATED: HCPCS | Performed by: PSYCHIATRY & NEUROLOGY

## 2022-02-23 PROCEDURE — G8432 DEP SCR NOT DOC, RNG: HCPCS | Performed by: PSYCHIATRY & NEUROLOGY

## 2022-02-23 PROCEDURE — G8427 DOCREV CUR MEDS BY ELIG CLIN: HCPCS | Performed by: PSYCHIATRY & NEUROLOGY

## 2022-02-23 PROCEDURE — 99215 OFFICE O/P EST HI 40 MIN: CPT | Performed by: PSYCHIATRY & NEUROLOGY

## 2022-02-23 RX ORDER — DICLOFENAC SODIUM 10 MG/G
GEL TOPICAL 4 TIMES DAILY
COMMUNITY

## 2022-02-23 RX ORDER — IRBESARTAN 300 MG/1
300 TABLET ORAL DAILY
COMMUNITY
Start: 2022-02-20

## 2022-02-23 RX ORDER — HYDROCHLOROTHIAZIDE 12.5 MG/1
TABLET ORAL
COMMUNITY
Start: 2022-02-20

## 2022-02-23 RX ORDER — ARIPIPRAZOLE 15 MG/1
15 TABLET ORAL EVERY MORNING
COMMUNITY
Start: 2022-02-20

## 2022-02-23 RX ORDER — TITANIUM DIOXIDE, OCTINOXATE, ZINC OXIDE 4.61; 1.6; .78 G/40ML; G/40ML; G/40ML
CREAM TOPICAL
COMMUNITY
Start: 2022-02-20

## 2022-02-23 NOTE — PROGRESS NOTES
Neurology Consult Note  Elana Combs was seen by synchronous (real-time) audio-video technology on 02/23/22. Consent:  She and/or her healthcare decision maker is aware that this patient-initiated Telehealth encounter is a billable service, with coverage as determined by her insurance carrier. She is aware that she may receive a bill and has provided verbal consent to proceed: Yes    I was in the office while conducting this encounter. Pursuant to the emergency declaration under the [facility] waiver authority and the [employer] Act, this Virtual  Visit was conducted, with patient's consent, to reduce the patient's risk of exposure to COVID-19 and provide continuity of care for an established patient. Services were provided through a video synchronous discussion virtually to substitute for in-person clinic visit. HISTORY PROVIDED BY: patient    Chief Complaint:   Chief Complaint   Patient presents with    Seizure     f/u      Subjective:    Elana Combs is a 49 y.o. right handed female who presents in office for continued management and establish care. This is a 79-year-old right-handed female with history of generalized anxiety disorder, depression, DJD, intellectual disability, tremors, schizophrenia, who was formally followed by followed by Dr. Verna Genao, in absence of Dr. Verna Genao, patient is establishing with me. Patient is said to be doing fairly well. To recap: Patient returns for a follow up visit. She has had relatively few tremors but this is dependant on abilify. She was weaned off the drug however her behavior was negatively affected by this. She has been mostly unchanged with regards to her demeanor and health. 1. Tremor of face and hands  Minimal compared to initial presentation.  Discussed the relation of her medications to tremor and reinforced that it is a risk vs benefit and that if the tremor can't be avoided it may be best to be tolerated in interest of her overall quality of life. 2. Mental retardation  stable  3. Schizophreniform disorder ([address]  Stable on abilify and benzotropine  Patient is a group home resident, being followed for tremor and seizure activity. No seizure has been reported since the last visit, tremor is said to have improved. Review of Systems - History obtained from group home staff, chart review and the patient  General ROS: positive for  - fatigue and sleep disturbance  Psychological ROS: positive for - anxiety, concentration difficulties, depression, mood swings and sleep disturbances  Ophthalmic ROS: positive for - decreased vision  ENT ROS: positive for - headaches and visual changes  Allergy and Immunology ROS: negative  Hematological and Lymphatic ROS: negative  Endocrine ROS: negative  Respiratory ROS: no cough, shortness of breath, or wheezing  Cardiovascular ROS: no chest pain or dyspnea on exertion  Gastrointestinal ROS: no abdominal pain, change in bowel habits, or black or bloody stools  Genito-Urinary ROS: no dysuria, trouble voiding, or hematuria  Musculoskeletal ROS: positive for - joint pain, joint stiffness, muscle pain and muscular weakness  Neurological ROS: positive for - gait disturbance, headaches, impaired coordination/balance, seizures, tremors, visual changes and weakness  Dermatological ROS: negative        Past Medical History:   Diagnosis Date    Anxiety disorder     Depression     Fatigue     Frequent headaches     Headache     Joint pain       No past surgical history on file.    Social History     Socioeconomic History    Marital status: SINGLE     Spouse name: Not on file    Number of children: Not on file    Years of education: Not on file    Highest education level: Not on file   Occupational History    Not on file   Tobacco Use    Smoking status: Never Smoker    Smokeless tobacco: Never Used   Substance and Sexual Activity  Alcohol use: No    Drug use: Not on file    Sexual activity: Not on file   Other Topics Concern    Not on file   Social History Narrative    Not on file     Social Determinants of Health     Financial Resource Strain:     Difficulty of Paying Living Expenses: Not on file   Food Insecurity:     Worried About Running Out of Food in the Last Year: Not on file    Snow of Food in the Last Year: Not on file   Transportation Needs:     Lack of Transportation (Medical): Not on file    Lack of Transportation (Non-Medical): Not on file   Physical Activity:     Days of Exercise per Week: Not on file    Minutes of Exercise per Session: Not on file   Stress:     Feeling of Stress : Not on file   Social Connections:     Frequency of Communication with Friends and Family: Not on file    Frequency of Social Gatherings with Friends and Family: Not on file    Attends Sikhism Services: Not on file    Active Member of [address] [place] [address] [employer] or Organizations: Not on file    Attends Club or Organization Meetings: Not on file    Marital Status: Not on file   Intimate Partner Violence:     Fear of Current or Ex-Partner: Not on file    Emotionally Abused: Not on file    Physically Abused: Not on file    Sexually Abused: Not on file   Housing Stability:     Unable to Pay for Housing in the Last Year: Not on file    Number of [place] in the Last Year: Not on file    Unstable Housing in the Last Year: Not on file     Family History   Problem Relation Age of Onset    Cancer Mother    [facility] Migraines Mother     Cancer Father          Objective:   ROS    No Known Allergies     Meds:  Outpatient Medications Prior to Visit   Medication Sig Dispense Refill    cranberry 400 mg cap       hydroCHLOROthiazide (HYDRODIURIL) 12.5 mg tablet       ARIPiprazole (ABILIFY) 15 mg tablet Take 15 mg by mouth Every morning.  irbesartan (AVAPRO) 300 mg tablet Take 300 mg by mouth daily.       diclofenac (Voltaren) 1 % gel Apply  to affected area four (4) times daily.  desonide (DESOWEN) 0.05 % topical ointment Apply  to affected area two (2) times a day.  ARIPiprazole (ABILIFY) 20 mg tablet Take 20 mg by mouth nightly.  benztropine (COGENTIN) 1 mg tablet Take  by mouth two (2) times a day.  carBAMazepine (TEGRETOL) 200 mg tablet Take 200 mg by mouth two (2) times a day.  lurasidone (LATUDA) 80 mg tab tablet Take  by mouth.  diphenhydrAMINE (BENADRYL) 25 mg capsule Take 25 mg by mouth every six (6) hours as needed.  chlorhexidine (PERIDEX) 0.12 % solution  (Patient not taking: Reported on 2/23/2022)      irbesartan (AVAPRO) 150 mg tablet Take 300 mg by mouth nightly. (Patient not taking: Reported on 2/23/2022)      nystatin (MYCOSTATIN) powder Apply  to affected area four (4) times daily. (Patient not taking: Reported on 2/23/2022)      ergocalciferol (VITAMIN D2) 50,000 unit capsule Take 50,000 Units by mouth. (Patient not taking: Reported on 2/23/2022)      ondansetron hcl (ZOFRAN, AS HYDROCHLORIDE,) 4 mg tablet Take 4 mg by mouth every eight (8) hours as needed for Nausea. (Patient not taking: Reported on 2/23/2022)       No facility-administered medications prior to visit. Imaging:  MRI Results (most recent):     CT Results (most recent):  No results found for this or any previous visit.        Reviewed records in [facility] tab today    Lab Review   Results for orders placed or performed in visit on 04/39/91   METABOLIC PANEL, COMPREHENSIVE   Result Value Ref Range    Glucose 113 (H) 65 - 99 mg/dL    BUN 15 6 - 24 mg/dL    Creatinine 0.77 0.57 - 1.00 mg/dL    GFR est non-AA 92 >59 mL/min/1.73    GFR est  >59 mL/min/1.73    BUN/Creatinine ratio 19 9 - 23    Sodium 144 134 - 144 mmol/L    Potassium 3.9 3.5 - 5.2 mmol/L    Chloride 110 (H) 96 - 106 mmol/L    CO2 20 20 - 29 mmol/L    Calcium 8.0 (L) 8.7 - 10.2 mg/dL    Protein, total 6.2 6.0 - 8.5 g/dL    Albumin 3.8 3.8 - 4.8 g/dL GLOBULIN, TOTAL 2.4 1.5 - 4.5 g/dL    A-G Ratio 1.6 1.2 - 2.2    Bilirubin, total 0.3 0.0 - 1.2 mg/dL    Alk. phosphatase 77 39 - 117 IU/L    AST (SGOT) 11 0 - 40 IU/L    ALT (SGPT) 7 0 - 32 IU/L   CARBAMAZEPINE   Result Value Ref Range    Carbamazepine 5.0 4.0 - 12.0 ug/mL        Exam:  There were no vitals taken for this visit. General:  Alert, cooperative, no distress. Head:  Normocephalic, without obvious abnormality, atraumatic. Respiratory:  Heart:   Non labored breathing  Deferred   Neck:    Symmetric   Extremities:  no cyanosis or edema. Pulses: Deferred       Neurologic:  MS: Alert and oriented x 3, speech intact. Language intact, able to name, repeat, and follow all commands. Attention and fund of knowledge appropriate. Recent and remote memory intact. Cranial Nerves:  II: visual fields Deferred   II: pupils Equal, round. II: optic disc Deferred   III,VII: ptosis none   III,IV,VI: extraocular muscles  EOMI, no nystagmus or diplopia   V: facial light touch sensation  Deferred   VII: facial muscle function   symmetric   VIII: hearing intact   IX: soft palate elevation  Deferred   XI: trapezius strength  5/5   XI: sternocleidomastoid strength 5/5   XII: tongue  Midline     Motor: normal bulk and tone, no tremor              Strength: Moves all extremities  Sensory: intact Deferred  Coordination: FTN  intact, FELICITY intact,  Gait:Deferred  Reflexes: Deferred           Assessment/Plan       ICD-10-CM ICD-9-CM    1. 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 Schizophreniform disorder ([facility]  F20.81 295.40    3. Intellectual disability  [id number]      Plan:  Patient is stable, I will continue patient on current medications and see patient yearly. Follow-up and Dispositions    · Return in about 1 year (around 2/23/2023).              Signed:  Khang Nice MD  2/23/2022

## 2022-02-23 NOTE — PROGRESS NOTES
Chief Complaint   Patient presents with    Seizure     f/u       1. Have you been to the ER, urgent care clinic since your last visit? Hospitalized since your last visit? No    2. Have you seen or consulted any other health care providers outside of the [address] [place] [address] since your last visit? Include any pap smears or colon screening. PT for left shoulder    Pt has bursitis in left shoulder. Pt is in a group home.

## 2022-03-19 PROBLEM — E66.01 SEVERE OBESITY (HCC): Status: ACTIVE | Noted: 2018-11-13

## 2022-12-06 ENCOUNTER — TELEPHONE (OUTPATIENT)
Dept: NEUROLOGY | Age: 47
End: 2022-12-06

## 2022-12-09 NOTE — TELEPHONE ENCOUNTER
[facility] called again stating they need us to fax over an updated seizure protocol ASAP.     Fax #: [phone]

## 2022-12-13 NOTE — TELEPHONE ENCOUNTER
Updated seizure protocol faxed to [facility] @ [phone]. Confirmation received.

## 2022-12-14 ENCOUNTER — TELEPHONE (OUTPATIENT)
Dept: NEUROLOGY | Age: 47
End: 2022-12-14

## 2022-12-14 NOTE — TELEPHONE ENCOUNTER
Call placed to [facility]. 2 patient identifiers received. Advised Lissa that I would ask our NP to do a short seizure protocol for the patient and would fax it over. Lissa indicated understanding.

## 2022-12-14 NOTE — TELEPHONE ENCOUNTER
Needs a seizure protocol specific to pt. Not the generic one. Has faxed over the old one that needed to be updated but got back the generic one in return. Please call to discuss.  [phone]

## 2023-03-03 ENCOUNTER — OFFICE VISIT (OUTPATIENT)
Dept: NEUROLOGY | Age: 48
End: 2023-03-03

## 2023-03-03 VITALS
HEART RATE: 86 BPM | BODY MASS INDEX: 36.67 KG/M2 | HEIGHT: 66 IN | TEMPERATURE: 97.8 F | WEIGHT: 228.2 LBS | OXYGEN SATURATION: 97 % | DIASTOLIC BLOOD PRESSURE: 70 MMHG | RESPIRATION RATE: 16 BRPM | SYSTOLIC BLOOD PRESSURE: 80 MMHG

## 2023-03-03 DIAGNOSIS — G40.301 GENERALIZED IDIOPATHIC EPILEPSY AND EPILEPTIC SYNDROMES WITH STATUS EPILEPTICUS, NOT INTRACTABLE (HCC): Primary | ICD-10-CM

## 2023-03-03 RX ORDER — POLYETHYLENE GLYCOL 3350 17 G/17G
POWDER, FOR SOLUTION ORAL
COMMUNITY
Start: 2023-01-13

## 2023-03-03 RX ORDER — KETOCONAZOLE 20 MG/ML
SHAMPOO, SUSPENSION TOPICAL
COMMUNITY
Start: 2022-08-05

## 2023-03-03 NOTE — PROGRESS NOTES
Chief Complaint   Patient presents with    Follow-up    Epilepsy     1. Have you been to the ER, urgent care clinic since your last visit? No Hospitalized since your last visit? No     2. Have you seen or consulted any other health care providers outside of the [address] [place] [address] since your last visit? No   Include any pap smears or colon screening. No     Patient here with care giver Lissa with   Chichi Castellanos- good neighbor .

## 2023-03-03 NOTE — PROGRESS NOTES
[facility]  [address] [place] [address] [address] [place] [address]  Mohan Lopez  Tel: [phone]  Fax: [phone]      Date:  23     Name:  Teagan Vigil  :  1975  MRN:  [id number]     PCP:  Sivan Jacobs MD    Chief Complaint   Patient presents with    Follow-up    Epilepsy       HISTORY OF PRESENT ILLNESS:  Patient presents today for regular follow up, last seen 2022 with Dr Tyler Leija. She is doing well, she is with one of her caregivers. Patient's had no seizures in many years. There are no other neurologic concerns or questions at this time. They do need an updated seizure protocol. Tegretol 200mg BID:she takes them as rx'ed, no side effects. Support with showers, dresses her self, she   PCP Dr Stuart Sevilla: Regular follow-ups, patient is good about taking medications. Psychiatrist.Regular follow-ups, no safety or behavior concerns, she is doing quite well. REVIEW OF SYSTEMS:     Review of Systems   Musculoskeletal:  Positive for falls (2022, she was coming out of the car, didn't have her glasses, notes she was really bruised, everything was ok.) and joint pain (left knee and B shoulders). Neurological:  Negative for seizures. Psychiatric/Behavioral:  Negative for depression and hallucinations. The patient does not have insomnia (things are better, not needed anything). Off and on days, issues with her mom and staff at times. She has ways to keep emotions intact. All other systems reviewed and are negative. Current Outpatient Medications   Medication Sig    ketoconazole (NIZORAL) 2 % shampoo apply to effected area    polyethylene glycol (MIRALAX) 17 gram/dose powder     cranberry 400 mg cap     hydroCHLOROthiazide (HYDRODIURIL) 12.5 mg tablet     ARIPiprazole (ABILIFY) 15 mg tablet Take 15 mg by mouth Every morning. diclofenac (VOLTAREN) 1 % gel Apply  to affected area four (4) times daily.     benztropine (COGENTIN) 1 mg tablet Take  by mouth two (2) times a day. carBAMazepine (TEGRETOL) 200 mg tablet Take 200 mg by mouth two (2) times a day. lurasidone (LATUDA) 80 mg tab tablet Take  by mouth. diphenhydrAMINE (BENADRYL) 25 mg capsule Take 25 mg by mouth every six (6) hours as needed. No current facility-administered medications for this visit. No Known Allergies  Past Medical History:   Diagnosis Date    Anxiety disorder     Depression     Fatigue     Frequent headaches     Headache     Joint pain      History reviewed. No pertinent surgical history. Social History     Socioeconomic History    Marital status: SINGLE     Spouse name: Not on file    Number of children: Not on file    Years of education: Not on file    Highest education level: Not on file   Occupational History    Not on file   Tobacco Use    Smoking status: Never    Smokeless tobacco: Never   Vaping Use    Vaping Use: Never used   Substance and Sexual Activity    Alcohol use: No    Drug use: Not on file    Sexual activity: Not on file   Other Topics Concern    Not on file   Social History Narrative    Not on file     Social Determinants of Health     Financial Resource Strain: Not on file   Food Insecurity: Not on file   Transportation Needs: Not on file   Physical Activity: Not on file   Stress: Not on file   Social Connections: Not on file   Intimate Partner Violence: Not on file   Housing Stability: Not on file     Family History   Problem Relation Age of Onset    Cancer Mother     Migraines Mother     Cancer Father          PHYSICAL EXAMINATION:    Visit Vitals  BP (!) 80/70 (BP 1 Location: Right upper arm, BP Patient Position: Sitting, BP Cuff Size: Large adult)   Pulse 86   Temp 97.8 °F (36.6 °C) (Temporal)   Resp 16   Ht 5' 6\" (1.676 m)   Wt 228 lb 3.2 oz (103.5 kg)   SpO2 97%   BMI 36.83 kg/m²     General:  Well defined, nourished, and well groomed individual in no acute distress. Neck: Supple, nontender, normal range of motion.    Musculoskeletal:  Extremities revealed no edema and had full range of motion of joints. Psych:  Good mood and bright affect with her caregiver. NEUROLOGICAL EXAMINATION:     Mental Status:   Alert and oriented to person, place, and time with recent and remote memory intact. Attention span and concentration are normal. Clear speech. Fund of knowledge preserved. Cranial Nerves:   PERRLA. Visual fields were full  EOM: no evidence of nystagmus  Facial sensation:  normal and symmetric  Facial motor: normal and symmetric, no facial droop noted. Hearing intact  SCM strength intact  Tongue: midline without fasciculations    Motor Examination: Normal tone. 5/5 muscle strength in bilateral upper extremities. No muscle wasting, no twitching or fasciculation noted. Sensory exam:  Normal throughout to light touch in BUE. Coordination:   Finger to nose and rapid arm movement testing was normal.  No resting or intention tremor. Negative Romberg, negative pronator drift. Gait and Station:  Steady while walking on toes, heels, and with tandem walking. Normal arm swing. Reflexes:  DTRs 2+ in bilateral biceps, brachioradialis, patella and ankle. No clonus noted. ASSESSMENT AND PLAN      ICD-10-CM ICD-9-CM    1. 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 She has had no recent seizures. They are to notify us if she has any breakthrough seizures. Updated seizure protocol letter was provided to the caregiver. Healthy lifestyle was encouraged, follow-up with relevant providers for other ongoing care.     Patient and/or family verbalized understand of all instructions and all questions/concerns were addressed. Safety/side effects of medications discussed. Patient remains a complex patient secondary to polypharmacy, significant comorbid conditions, and use of high-risk medications which complicate the decision making process related to patient's neurologic diagnosis. We will plan to see the patient back in 1 year, sooner if needed.     Scott Puentes, DONGP-BC

## 2023-03-03 NOTE — LETTER
3/3/2023 11:19 AM    Ms. Esteban Pedraza [id number]    Seizure protocol    Protect her head. Carolyn Lombard on her side with something soft under her head to protect her head. Dont place anything in her mouth. Note time of start and time of end of seizure  Note any abnormal movement during the seizure    For seizures less than 5 minutes with return to baseline within 15 minutes. Report to neurologist. No need for ER visit if not injured. For seizures lasting more than 5 minutes or if she does not regain baseline functioning within 20 minutes or if she has multiple seizures in one day call 911.        Sincerely,      Patt Downs NP

## 2025-07-20 ENCOUNTER — HOSPITAL ENCOUNTER (EMERGENCY)
Facility: HOSPITAL | Age: 50
Discharge: OTHER INSTITUTION | End: 2025-07-20
Attending: EMERGENCY MEDICINE
Payer: MEDICARE

## 2025-07-20 ENCOUNTER — APPOINTMENT (OUTPATIENT)
Facility: HOSPITAL | Age: 50
End: 2025-07-20
Payer: MEDICARE

## 2025-07-20 VITALS
BODY MASS INDEX: 33.98 KG/M2 | WEIGHT: 210.54 LBS | SYSTOLIC BLOOD PRESSURE: 129 MMHG | HEART RATE: 71 BPM | OXYGEN SATURATION: 100 % | TEMPERATURE: 97.5 F | RESPIRATION RATE: 16 BRPM | DIASTOLIC BLOOD PRESSURE: 71 MMHG

## 2025-07-20 DIAGNOSIS — S02.2XXA CLOSED FRACTURE OF NASAL BONE, INITIAL ENCOUNTER: Primary | ICD-10-CM

## 2025-07-20 PROCEDURE — 72125 CT NECK SPINE W/O DYE: CPT

## 2025-07-20 PROCEDURE — 70450 CT HEAD/BRAIN W/O DYE: CPT

## 2025-07-20 PROCEDURE — 70486 CT MAXILLOFACIAL W/O DYE: CPT

## 2025-07-20 PROCEDURE — 99284 EMERGENCY DEPT VISIT MOD MDM: CPT

## 2025-07-20 NOTE — ED PROVIDER NOTES
[facility] EMERGENCY DEPARTMENT  EMERGENCY DEPARTMENT ENCOUNTER      Patient Name: Shilpa Hart  MRN: [id number]  Birthdate 1975  Date of Evaluation: 7/20/2025  Physician: Alfonso Pickens MD    CHIEF COMPLAINT       Chief Complaint   Patient presents with    Fall       HISTORY OF PRESENT ILLNESS   (Location/Symptom, Timing/Onset, Context/Setting, Quality, Duration, Modifying Factors, Severity)   Shilpa Hart, 50 y.o., female     50-year-old female presents with a chief complaint of fall. Patient reports stripping this evening and falling face first to the ground. She did not lose consciousness. She did have some bleeding from her teeth. She complains of pain in the nose.           Nursing Notes were reviewed.    REVIEW OF SYSTEMS    (Not required)   Review of Systems    Except as noted above the remainder of the review of systems was reviewed and negative.     PAST MEDICAL HISTORY     Past Medical History:   Diagnosis Date    Anxiety disorder     Depression     Fatigue     Frequent headaches     Headache     Joint pain        SURGICAL HISTORY     No past surgical history on file.    CURRENT MEDICATIONS       Discharge Medication List as of 7/20/2025  8:13 PM        CONTINUE these medications which have NOT CHANGED    Details   ARIPiprazole (ABILIFY) 15 MG tablet Take 15 mg by mouth every morningHistorical Med      benztropine (COGENTIN) 1 MG tablet Take by mouth 2 times dailyHistorical Med      carBAMazepine (TEGRETOL) 200 MG tablet Take 200 mg by mouth 2 times dailyHistorical Med      diclofenac sodium (VOLTAREN) 1 % GEL Apply topically 4 times daily, Topical, 4 TIMES DAILY, Historical Med      diphenhydrAMINE (BENADRYL) 25 MG capsule Take 25 mg by mouth every 6 hours as neededHistorical Med      hydroCHLOROthiazide (HYDRODIURIL) 12.5 MG tablet ceived the following from [facility]: Outside name: hydroCHLOROthiazide (HYDRODIURIL) 12.5 mg tabletHistorical Med      ketoconazole

## 2025-07-20 NOTE — ED TRIAGE NOTES
Pt arrives ambulatory for complaints of a trip and fall that occurred today. Reports that she tripped over a door strip.     Pt reports falling face forward.     Denies taking any blood thinners or LOC.

## 2025-07-21 NOTE — ED NOTES
Charge nurse came to the bedside to speak to caregiver and patient about discharge paper work and patients facility form.   Dr. Pickens signed off for to follow instructions and follow up on provided discharge work.

## 2025-07-21 NOTE — ED NOTES
Patients caregiver adamant about provider filling out one of their facility forms. Demanded to speak to charge nurse.